# Patient Record
Sex: FEMALE | Race: WHITE | NOT HISPANIC OR LATINO | Employment: UNEMPLOYED | URBAN - NONMETROPOLITAN AREA
[De-identification: names, ages, dates, MRNs, and addresses within clinical notes are randomized per-mention and may not be internally consistent; named-entity substitution may affect disease eponyms.]

---

## 2023-11-08 ENCOUNTER — OFFICE VISIT (OUTPATIENT)
Dept: CARDIOLOGY | Facility: CLINIC | Age: 28
End: 2023-11-08
Payer: COMMERCIAL

## 2023-11-08 VITALS
WEIGHT: 186.6 LBS | BODY MASS INDEX: 31.09 KG/M2 | HEART RATE: 104 BPM | SYSTOLIC BLOOD PRESSURE: 124 MMHG | HEIGHT: 65 IN | DIASTOLIC BLOOD PRESSURE: 72 MMHG

## 2023-11-08 DIAGNOSIS — R07.89 CHEST PAIN, ATYPICAL: ICD-10-CM

## 2023-11-08 DIAGNOSIS — R00.2 PALPITATIONS: Primary | ICD-10-CM

## 2023-11-08 DIAGNOSIS — R06.02 SHORTNESS OF BREATH: ICD-10-CM

## 2023-11-08 DIAGNOSIS — R79.89 ELEVATED BRAIN NATRIURETIC PEPTIDE (BNP) LEVEL: ICD-10-CM

## 2023-11-08 DIAGNOSIS — Z72.0 TOBACCO ABUSE: ICD-10-CM

## 2023-11-08 DIAGNOSIS — R60.0 LOCALIZED EDEMA: ICD-10-CM

## 2023-11-08 DIAGNOSIS — R00.0 SINUS TACHYCARDIA: ICD-10-CM

## 2023-11-08 DIAGNOSIS — E66.9 OBESITY (BMI 30.0-34.9): ICD-10-CM

## 2023-11-08 PROCEDURE — 99204 OFFICE O/P NEW MOD 45 MIN: CPT | Performed by: NURSE PRACTITIONER

## 2023-11-08 PROCEDURE — 93000 ELECTROCARDIOGRAM COMPLETE: CPT | Performed by: NURSE PRACTITIONER

## 2023-11-08 RX ORDER — PREDNISONE 5 MG/1
TABLET ORAL
COMMUNITY

## 2023-11-08 RX ORDER — IBUPROFEN 600 MG/1
600 TABLET ORAL EVERY 6 HOURS PRN
COMMUNITY

## 2023-11-08 RX ORDER — BENZONATATE 100 MG/1
CAPSULE ORAL
COMMUNITY
Start: 2023-09-08

## 2023-11-08 RX ORDER — GUAIFENESIN 600 MG/1
TABLET, EXTENDED RELEASE ORAL EVERY 12 HOURS SCHEDULED
COMMUNITY
Start: 2023-11-08

## 2023-11-08 RX ORDER — BROMPHENIRAMINE MALEATE, PSEUDOEPHEDRINE HYDROCHLORIDE, AND DEXTROMETHORPHAN HYDROBROMIDE 2; 30; 10 MG/5ML; MG/5ML; MG/5ML
SYRUP ORAL
COMMUNITY
Start: 2023-11-08

## 2023-11-08 RX ORDER — AMOXICILLIN AND CLAVULANATE POTASSIUM 875; 125 MG/1; MG/1
TABLET, FILM COATED ORAL EVERY 12 HOURS SCHEDULED
COMMUNITY
Start: 2023-11-08

## 2023-11-08 RX ORDER — HYDROCHLOROTHIAZIDE 12.5 MG/1
12.5 TABLET ORAL DAILY
COMMUNITY

## 2023-11-08 NOTE — PROGRESS NOTES
Chief Complaint   Patient presents with    Establish Care     Pt is here to establish care.  She states she has been having palpitations, SOB, swelling in her hands and CP for about 7 months.  She states the swelling is brought on my normal daily activities (mopping, laundry, dishes).  She states she is only SOB when she is active.  She states CP is mainly when she takes a deep breath.  She had a steroid and abx shot this morning for congestion/ear infection.    Cardiac History     Pt denies ever being evaluated by cardiology.  She did have an echo completed in September at [facility], however the report is not available.    Lab Work     Pt had labs completed this morning with her PCP.       Cardiac Complaints  chest pressure/discomfort, dyspnea, and palpitations      Subjective   Lillian Sheikh is a 28 y.o. female with no significant medical history. She is referred today from PCP for elevated BNP, which is not available for review. She admits that echo was ordered to assess, but the report was lost.  She states HCTZ started at low dose and this has been beneficial. She still has some mild edema in her hands, but for the most part, this has improved. She does report swelling after activities as well as chest pain that is a tightness or sharp pain in her chest with activity. She is SOA both at rest and with exertion. She reports WBC has been elevated for sometime, they have done multiple labs and testing. Most recent workup showed possible MONO. She reports recently being sick, got both a steroid and rocephin shot today. Was negative for COVID, FLU, RSV.  Chest xray was negative as well. Labs today to reassess WBC. She denies prior workup, except for recent echo which is not available. She also reports recent holter monitor, which is also not available. Family history of IHD noted. Positive for tobacco abuse.        Cardiac History  Past Surgical History:   Procedure Laterality Date     SECTION  2014     CONVERTED (HISTORICAL) HOLTER  09/2023    ORIF ULNA/RADIUS FRACTURES Left 2002    TONSILLECTOMY  2020    TUBAL ABDOMINAL LIGATION  08/17/2014       Current Outpatient Medications   Medication Sig Dispense Refill    amoxicillin-clavulanate (AUGMENTIN) 875-125 MG per tablet Every 12 (Twelve) Hours.      benzonatate (TESSALON) 100 MG capsule TAKE ONE CAPSULE BY MOUTH EVERY 4 HOURS AS NEEDED FOR 5 DAYS      brompheniramine-pseudoephedrine-DM 30-2-10 MG/5ML syrup Take 10 mL Orally every 6 hours as needed for cough/congestion for 5 days      cholecalciferol (VITAMIN D3) 1.25 MG (67335 UT) capsule Take 1 capsule by mouth Every 7 (Seven) Days.      cyanocobalamin ([employer] Vitamin B-12) 1000 MCG tablet TAKE ONE TABLET BY MOUTH DAILY for 30      guaiFENesin (MUCINEX) 600 MG 12 hr tablet Every 12 (Twelve) Hours.      hydroCHLOROthiazide (HYDRODIURIL) 12.5 MG tablet Take 1 tablet by mouth Daily.      ibuprofen (ADVIL,MOTRIN) 600 MG tablet Take 1 tablet by mouth Every 6 (Six) Hours As Needed for Mild Pain.      predniSONE 5 MG (21) tablet therapy pack dose pack Complete 1 therapy pack as directed Orally as directed for 6 days       No current facility-administered medications for this visit.       Patient has no known allergies.    Past Medical History:   Diagnosis Date    Anemia     Depression     Hypertension     Migraine     Palpitations        Social History     Socioeconomic History    Marital status:    Tobacco Use    Smoking status: Every Day     Packs/day: 0.50     Years: 12.00     Additional pack years: 0.00     Total pack years: 6.00     Types: Cigarettes    Smokeless tobacco: Never   Vaping Use    Vaping Use: Never used   Substance and Sexual Activity    Alcohol use: Yes     Comment: about 6 per month    Drug use: Yes     Types: Marijuana    Sexual activity: Yes     Partners: Male     Birth control/protection: Tubal ligation       Family History   Problem Relation Age of Onset    Arthritis Mother     Asthma Mother   "   Kidney disease Mother     Stroke Mother     Diabetes Father     Hypertension Father     Thyroid disease Brother     Stroke Maternal Grandmother     No Known Problems Paternal Grandmother     Sick sinus syndrome Paternal Grandfather     No Known Problems Son     No Known Problems Son     Asthma Daughter        Review of Systems   Constitutional: Positive for malaise/fatigue. Negative for night sweats.   Cardiovascular:  Positive for chest pain, dyspnea on exertion, leg swelling and palpitations. Negative for claudication, irregular heartbeat, near-syncope and syncope.   Respiratory:  Positive for shortness of breath. Negative for cough and wheezing.    Musculoskeletal:  Negative for back pain, joint pain and stiffness.   Gastrointestinal:  Negative for anorexia, heartburn, nausea and vomiting.   Genitourinary:  Negative for dysuria, hematuria, hesitancy and nocturia.   Neurological:  Negative for dizziness, headaches and light-headedness.   Psychiatric/Behavioral:  Negative for depression and memory loss. The patient is not nervous/anxious.            Objective     /72 (BP Location: Right arm, Patient Position: Sitting)   Pulse 104   Ht 165.1 cm (65\")   Wt 84.6 kg (186 lb 9.6 oz)   BMI 31.05 kg/m²     Constitutional:       Appearance: Not in distress.   Eyes:      Pupils: Pupils are equal, round, and reactive to light.   HENT:      Nose: Nose normal.   Pulmonary:      Effort: Pulmonary effort is normal.      Breath sounds: Normal breath sounds.   Cardiovascular:      PMI at left midclavicular line. Tachycardia present. Regular rhythm.      Murmurs: There is a systolic murmur.   Abdominal:      Palpations: Abdomen is soft.   Musculoskeletal: Normal range of motion.      Cervical back: Normal range of motion and neck supple. Skin:     General: Skin is warm and dry.   Neurological:      Mental Status: Alert.           ECG 12 Lead    Date/Time: 11/8/2023 2:38 PM  Performed by: Venita Lai, " JENNI    Authorized by: Venita Lai APRN  Comparison: compared with previous ECG from 9/6/2023  Similar to previous ECG  Comparison to previous ECG: Rate higher today  Rhythm: sinus tachycardia  BPM: 104    Clinical impression: normal ECG  Comments: Normal QT               Diagnoses and all orders for this visit:    1. Palpitations (Primary)  -     Adult Transthoracic Echo Complete W/ Cont if Necessary Per Protocol; Future  -     Treadmill Stress Test; Future  -     ECG 12 Lead  -     TSH; Future    2. Shortness of breath  -     Adult Transthoracic Echo Complete W/ Cont if Necessary Per Protocol; Future  -     Treadmill Stress Test; Future  -     BNP; Future  -     Comprehensive Metabolic Panel; Future    3. Localized edema  -     Adult Transthoracic Echo Complete W/ Cont if Necessary Per Protocol; Future  -     Treadmill Stress Test; Future  -     BNP; Future  -     Comprehensive Metabolic Panel; Future    4. Sinus tachycardia  -     Adult Transthoracic Echo Complete W/ Cont if Necessary Per Protocol; Future  -     Treadmill Stress Test; Future  -     ECG 12 Lead  -     TSH; Future    5. Chest pain, atypical    6. Elevated brain natriuretic peptide (BNP) level    7. Tobacco abuse    8. Obesity (BMI 30.0-34.9)             Palpitations/SOA: Workup with stress and echo encouraged to assess for functional concerns and/or structural concerns. Most recent echo report has been 'lost'. Stress to be done as treadmill stress. EKG done today in regards showed sinus tach, but she had just had a steroid injection, quite possibly side effect. Special attention to be paid to her HR during stress.     Elevated BNP: On HCTZ therapy, tolerates well. Edema has improved. Will repeat BNP to assess for efficacy of therapy.      Elevated WBC: Followed by your office, further workup today.      Tobacco abuse, smoking despite concerns, cutting back, not ready to quit at present.    BMI noted at 31.05, good cardiac diet encouraged.      6 month follow up urged, sooner if needed.        Problems Addressed this Visit    None  Visit Diagnoses       Palpitations    -  Primary    Relevant Orders    Adult Transthoracic Echo Complete W/ Cont if Necessary Per Protocol    Treadmill Stress Test    ECG 12 Lead    TSH    Shortness of breath        Relevant Orders    Adult Transthoracic Echo Complete W/ Cont if Necessary Per Protocol    Treadmill Stress Test    BNP    Comprehensive Metabolic Panel    Localized edema        Relevant Orders    Adult Transthoracic Echo Complete W/ Cont if Necessary Per Protocol    Treadmill Stress Test    BNP    Comprehensive Metabolic Panel    Sinus tachycardia        Relevant Orders    Adult Transthoracic Echo Complete W/ Cont if Necessary Per Protocol    Treadmill Stress Test    ECG 12 Lead    TSH    Chest pain, atypical        Elevated brain natriuretic peptide (BNP) level        Tobacco abuse        Obesity (BMI 30.0-34.9)              Diagnoses         Codes Comments    Palpitations    -  Primary ICD-10-CM: R00.2  ICD-9-CM: 785.1     Shortness of breath     ICD-10-CM: R06.02  ICD-9-CM: 786.05     Localized edema     ICD-10-CM: R60.0  ICD-9-CM: 782.3     Sinus tachycardia     ICD-10-CM: R00.0  ICD-9-CM: 427.89     Chest pain, atypical     ICD-10-CM: R07.89  ICD-9-CM: 786.59     Elevated brain natriuretic peptide (BNP) level     ICD-10-CM: R79.89  ICD-9-CM: 790.99     Tobacco abuse     ICD-10-CM: Z72.0  ICD-9-CM: 305.1     Obesity (BMI 30.0-34.9)     ICD-10-CM: E66.9  ICD-9-CM: 278.00             Lillian Sheikh  reports that she has been smoking cigarettes. She has a 6.00 pack-year smoking history. She has never used smokeless tobacco.. I have educated her on the risk of diseases from using tobacco products.     I advised her to quit and she is not willing to quit.    I spent 3  minutes counseling the patient.                 Electronically signed by Venita Lai, JENNI November 8, 2023 17:20 EST

## 2023-11-21 ENCOUNTER — LAB (OUTPATIENT)
Dept: LAB | Facility: HOSPITAL | Age: 28
End: 2023-11-21
Payer: COMMERCIAL

## 2023-11-21 ENCOUNTER — HOSPITAL ENCOUNTER (OUTPATIENT)
Dept: CARDIOLOGY | Facility: HOSPITAL | Age: 28
Discharge: HOME | End: 2023-11-21
Payer: COMMERCIAL

## 2023-11-21 VITALS — WEIGHT: 186.51 LBS | HEIGHT: 65 IN | BODY MASS INDEX: 31.07 KG/M2

## 2023-11-21 DIAGNOSIS — R60.0 LOCALIZED EDEMA: ICD-10-CM

## 2023-11-21 DIAGNOSIS — R06.02 SHORTNESS OF BREATH: ICD-10-CM

## 2023-11-21 DIAGNOSIS — R00.0 SINUS TACHYCARDIA: ICD-10-CM

## 2023-11-21 DIAGNOSIS — R00.2 PALPITATIONS: ICD-10-CM

## 2023-11-21 LAB
ALBUMIN SERPL-MCNC: 4.1 G/DL (ref 3.5–5.2)
ALBUMIN/GLOB SERPL: 1.8 G/DL
ALP SERPL-CCNC: 59 U/L (ref 39–117)
ALT SERPL W P-5'-P-CCNC: 19 U/L (ref 1–33)
ANION GAP SERPL CALCULATED.3IONS-SCNC: 10.7 MMOL/L (ref 5–15)
AST SERPL-CCNC: 14 U/L (ref 1–32)
BH CV ECHO MEAS - AO ROOT DIAM: 2.7 CM
BH CV ECHO MEAS - EDV(CUBED): 97.4 ML
BH CV ECHO MEAS - EF(MOD-BP): 56 %
BH CV ECHO MEAS - EF_3D-VOL: 53 %
BH CV ECHO MEAS - ESV(CUBED): 34.4 ML
BH CV ECHO MEAS - FS: 29.4 %
BH CV ECHO MEAS - IVS/LVPW: 1.09 CM
BH CV ECHO MEAS - IVSD: 0.94 CM
BH CV ECHO MEAS - LA DIMENSION: 3.8 CM
BH CV ECHO MEAS - LAT PEAK E' VEL: 18.6 CM/SEC
BH CV ECHO MEAS - LV MASS(C)D: 137.7 GRAMS
BH CV ECHO MEAS - LV MAX PG: 4.2 MMHG
BH CV ECHO MEAS - LV MEAN PG: 2.21 MMHG
BH CV ECHO MEAS - LV V1 MAX: 102.8 CM/SEC
BH CV ECHO MEAS - LV V1 VTI: 20.7 CM
BH CV ECHO MEAS - LVIDD: 4.6 CM
BH CV ECHO MEAS - LVIDS: 3.3 CM
BH CV ECHO MEAS - LVPWD: 0.86 CM
BH CV ECHO MEAS - MED PEAK E' VEL: 13.4 CM/SEC
BH CV ECHO MEAS - MV A MAX VEL: 36 CM/SEC
BH CV ECHO MEAS - MV DEC SLOPE: 378.9 CM/SEC2
BH CV ECHO MEAS - MV DEC TIME: 0.3 SEC
BH CV ECHO MEAS - MV E MAX VEL: 78.1 CM/SEC
BH CV ECHO MEAS - MV E/A: 2.17
BH CV ECHO MEAS - MV MAX PG: 4.5 MMHG
BH CV ECHO MEAS - MV MEAN PG: 1.46 MMHG
BH CV ECHO MEAS - MV P1/2T: 86.6 MSEC
BH CV ECHO MEAS - MV V2 VTI: 31.1 CM
BH CV ECHO MEAS - MVA(P1/2T): 2.5 CM2
BH CV ECHO MEAS - PA V2 MAX: 102.4 CM/SEC
BH CV ECHO MEAS - RAP SYSTOLE: 8 MMHG
BH CV ECHO MEAS - RV MAX PG: 2.02 MMHG
BH CV ECHO MEAS - RV V1 MAX: 71 CM/SEC
BH CV ECHO MEAS - RV V1 VTI: 14.1 CM
BH CV ECHO MEAS - RVDD: 2.9 CM
BH CV ECHO MEAS - RVSP: 17.6 MMHG
BH CV ECHO MEAS - TAPSE (>1.6): 2.01 CM
BH CV ECHO MEAS - TR MAX PG: 9.6 MMHG
BH CV ECHO MEAS - TR MAX VEL: 154.6 CM/SEC
BH CV ECHO MEASUREMENTS AVERAGE E/E' RATIO: 4.88
BH CV STRESS RECOVERY BP: NORMAL MMHG
BH CV STRESS RECOVERY HR: 91 BPM
BH CV XLRA - TDI S': 10.7 CM/SEC
BILIRUB SERPL-MCNC: 0.2 MG/DL (ref 0–1.2)
BUN SERPL-MCNC: 13 MG/DL (ref 6–20)
BUN/CREAT SERPL: 14.3 (ref 7–25)
CALCIUM SPEC-SCNC: 9 MG/DL (ref 8.6–10.5)
CHLORIDE SERPL-SCNC: 101 MMOL/L (ref 98–107)
CO2 SERPL-SCNC: 24.3 MMOL/L (ref 22–29)
CREAT SERPL-MCNC: 0.91 MG/DL (ref 0.57–1)
EGFRCR SERPLBLD CKD-EPI 2021: 87.2 ML/MIN/1.73
GLOBULIN UR ELPH-MCNC: 2.3 GM/DL
GLUCOSE SERPL-MCNC: 103 MG/DL (ref 65–99)
MAXIMAL PREDICTED HEART RATE: 190 BPM
NT-PROBNP SERPL-MCNC: 269.9 PG/ML (ref 0–450)
PERCENT MAX PREDICTED HR: 90 %
POTASSIUM SERPL-SCNC: 3.8 MMOL/L (ref 3.5–5.2)
PROT SERPL-MCNC: 6.4 G/DL (ref 6–8.5)
SINUS: 2.5 CM
SODIUM SERPL-SCNC: 136 MMOL/L (ref 136–145)
STRESS BASELINE BP: NORMAL MMHG
STRESS BASELINE HR: 73 BPM
STRESS PERCENT HR: 106 %
STRESS POST ESTIMATED WORKLOAD: 8.6 METS
STRESS POST EXERCISE DUR MIN: 6 MIN
STRESS POST EXERCISE DUR SEC: 32 SEC
STRESS POST PEAK BP: NORMAL MMHG
STRESS POST PEAK HR: 171 BPM
STRESS TARGET HR: 162 BPM
TSH SERPL DL<=0.05 MIU/L-ACNC: 2.48 UIU/ML (ref 0.27–4.2)

## 2023-11-21 PROCEDURE — 93017 CV STRESS TEST TRACING ONLY: CPT

## 2023-11-21 PROCEDURE — 93306 TTE W/DOPPLER COMPLETE: CPT

## 2023-11-21 PROCEDURE — 84443 ASSAY THYROID STIM HORMONE: CPT | Performed by: NURSE PRACTITIONER

## 2023-11-21 PROCEDURE — 83880 ASSAY OF NATRIURETIC PEPTIDE: CPT | Performed by: NURSE PRACTITIONER

## 2023-11-21 PROCEDURE — 80053 COMPREHEN METABOLIC PANEL: CPT | Performed by: NURSE PRACTITIONER

## 2023-11-22 RX ORDER — METOPROLOL SUCCINATE 25 MG/1
25 TABLET, EXTENDED RELEASE ORAL DAILY
Qty: 90 TABLET | Refills: 3 | Status: SHIPPED | OUTPATIENT
Start: 2023-11-22

## 2023-11-22 NOTE — TELEPHONE ENCOUNTER
----- Message from JENNI Mooney sent at 11/21/2023  4:46 PM EST -----  Neg stress, increased HR, add toprol at 25mg Xl

## 2024-05-16 ENCOUNTER — OFFICE VISIT (OUTPATIENT)
Dept: CARDIOLOGY | Facility: CLINIC | Age: 29
End: 2024-05-16
Payer: COMMERCIAL

## 2024-05-16 VITALS
DIASTOLIC BLOOD PRESSURE: 78 MMHG | HEART RATE: 90 BPM | WEIGHT: 206.6 LBS | BODY MASS INDEX: 35.27 KG/M2 | HEIGHT: 64 IN | SYSTOLIC BLOOD PRESSURE: 122 MMHG

## 2024-05-16 DIAGNOSIS — F17.200 SMOKING: ICD-10-CM

## 2024-05-16 DIAGNOSIS — R60.0 EDEMA LEG: ICD-10-CM

## 2024-05-16 DIAGNOSIS — E66.01 SEVERE OBESITY (BMI 35.0-39.9) WITH COMORBIDITY: ICD-10-CM

## 2024-05-16 DIAGNOSIS — R00.2 PALPITATIONS: ICD-10-CM

## 2024-05-16 DIAGNOSIS — R00.0 TACHYCARDIA: Primary | ICD-10-CM

## 2024-05-16 RX ORDER — VARENICLINE TARTRATE 1 MG/1
1 TABLET, FILM COATED ORAL 2 TIMES DAILY
Qty: 56 TABLET | Refills: 1 | Status: SHIPPED | OUTPATIENT
Start: 2024-06-13 | End: 2024-08-08

## 2024-05-16 RX ORDER — FLUTICASONE PROPIONATE AND SALMETEROL 50; 250 UG/1; UG/1
POWDER RESPIRATORY (INHALATION)
COMMUNITY
Start: 2024-02-13

## 2024-05-16 RX ORDER — VARENICLINE TARTRATE 0.5 (11)-1
KIT ORAL
Qty: 1 EACH | Refills: 0 | Status: SHIPPED | OUTPATIENT
Start: 2024-05-16 | End: 2024-06-13

## 2024-05-16 RX ORDER — METOPROLOL SUCCINATE 50 MG/1
50 TABLET, EXTENDED RELEASE ORAL DAILY
Qty: 30 TABLET | Refills: 11 | Status: SHIPPED | OUTPATIENT
Start: 2024-05-16

## 2024-05-16 RX ORDER — HYDROCHLOROTHIAZIDE 12.5 MG/1
12.5 TABLET ORAL DAILY
Qty: 90 TABLET | Refills: 2 | Status: SHIPPED | OUTPATIENT
Start: 2024-05-16

## 2024-05-16 NOTE — PROGRESS NOTES
Chief Complaint   Patient presents with    Follow-up     Pt is here for cardiac follow up.  Pt reports SOB, she is now seeing Dr Teague who has her on two inhalers.  She is also wanting to quit smoking.  She tried patches and gum and was not successful.  She states her palpitations are getting better with meds.  She denies dizziness or CP.  She does not take a daily ASA.      Med Refill     Pt request 30 day refills to be sent to [facility].  Medications were verified by  the pt.    Lab Work     Pt states their last labs were in November with [facility].         Cardiac Complaints  dyspnea      Subjective   Lillian Sheikh is a 29 y.o. female referred in  for edema and elevated BNP. She underwent workup after consult in 2023 which was negative for ischemia, good LV function, and only trace MR noted. HR elevated on stress, toprol added.    She comes today for follow up and admits to SOA, now followed by pulmonary, diagnosed with asthma, using inhalers which have been beneficial. She does admit BB have helped her palpitations. She does admit she is currently trying to quit smoking, tried gum unsuccessful, wanting other options. Labs November showed: TSH 2.480, .9, BUN 13, Creatinine 0.91, Na 136, K 3.8, GFR 87.2. Refills requested.        Cardiac History  Past Surgical History:   Procedure Laterality Date    CARDIOVASCULAR STRESS TEST  2023    R.Stress- 6.32 Min. 8.6 METS. 90% THR. 165/69. Negative     SECTION  2014    CONVERTED (HISTORICAL) HOLTER  09/15/2023    @ [facility]. 4 Days. Avg 89. . Rare PAC & PVC. Rare AV Block    ECHO - CONVERTED  2023    EF 55%. LA- 3.8. Trace-Mild MR. RVSP- 18 mmHg    ORIF ULNA/RADIUS FRACTURES Left     TONSILLECTOMY  2020    TUBAL ABDOMINAL LIGATION  2014       Current Outpatient Medications   Medication Sig Dispense Refill    Advair Diskus 250-50 MCG/ACT DISKUS       cholecalciferol (VITAMIN D3) 1.25 MG (21104 UT) capsule  Take 1 capsule by mouth Every 7 (Seven) Days.      cyanocobalamin ([employer] Vitamin B-12) 1000 MCG tablet TAKE ONE TABLET BY MOUTH DAILY for 30      hydroCHLOROthiazide 12.5 MG tablet Take 1 tablet by mouth Daily. 90 tablet 2    ibuprofen (ADVIL,MOTRIN) 600 MG tablet Take 1 tablet by mouth Every 6 (Six) Hours As Needed for Mild Pain.      Tiotropium Bromide Monohydrate (SPIRIVA HANDIHALER IN) Inhale.      metoprolol succinate XL (TOPROL-XL) 50 MG 24 hr tablet Take 1 tablet by mouth Daily. 30 tablet 11    [START ON 6/13/2024] varenicline (Chantix Continuing Month Pak) 1 MG tablet Take 1 tablet by mouth 2 (Two) Times a Day for 56 days. 56 tablet 1    Varenicline Tartrate, Starter, 0.5 MG X 11 & 1 MG X 42 tablet therapy pack Take 0.5 mg by mouth Daily for 3 days, THEN 0.5 mg 2 (Two) Times a Day for 4 days, THEN 1 mg 2 (Two) Times a Day for 21 days. Take 0.5 mg po daily x 3 days, then 0.5 mg po bid x 4 days, then 1 mg po bid 1 each 0     No current facility-administered medications for this visit.       Patient has no known allergies.    Past Medical History:   Diagnosis Date    Anemia     Depression     Hypertension     Migraine     Palpitations        Social History     Socioeconomic History    Marital status:    Tobacco Use    Smoking status: Every Day     Current packs/day: 0.50     Average packs/day: 0.5 packs/day for 15.4 years (7.7 ttl pk-yrs)     Types: Cigarettes     Start date: 2009    Smokeless tobacco: Never   Vaping Use    Vaping status: Never Used   Substance and Sexual Activity    Alcohol use: Yes     Comment: social    Drug use: Yes     Frequency: 2.0 times per week     Types: Marijuana    Sexual activity: Yes     Partners: Male     Birth control/protection: Tubal ligation       Family History   Problem Relation Age of Onset    Arthritis Mother     Asthma Mother     Kidney disease Mother     Stroke Mother     Diabetes Father     Hypertension Father     Thyroid disease Brother     Stroke Maternal  "Grandmother     No Known Problems Paternal Grandmother     Sick sinus syndrome Paternal Grandfather     No Known Problems Son     No Known Problems Son     Asthma Daughter        Review of Systems   Constitutional: Negative for malaise/fatigue and night sweats.   Cardiovascular:  Negative for chest pain, claudication, dyspnea on exertion, irregular heartbeat, leg swelling, near-syncope, orthopnea, palpitations and syncope.   Respiratory:  Positive for shortness of breath. Negative for cough and wheezing.    Musculoskeletal:  Negative for back pain, joint pain and stiffness.   Gastrointestinal:  Negative for anorexia, dysphagia, nausea and vomiting.   Genitourinary:  Negative for dysuria, hematuria, hesitancy and nocturia.   Neurological:  Negative for dizziness, light-headedness and loss of balance.   Psychiatric/Behavioral:  Negative for depression and memory loss. The patient is not nervous/anxious.            Objective     /78 (BP Location: Left arm, Patient Position: Sitting)   Pulse 90   Ht 162.6 cm (64\")   Wt 93.7 kg (206 lb 9.6 oz)   BMI 35.46 kg/m²     Constitutional:       Appearance: Not in distress.   Eyes:      Pupils: Pupils are equal, round, and reactive to light.   HENT:      Nose: Nose normal.   Pulmonary:      Effort: Pulmonary effort is normal.      Breath sounds: Normal breath sounds.   Cardiovascular:      PMI at left midclavicular line. Normal rate. Regular rhythm.      Murmurs: There is a systolic murmur.   Abdominal:      Palpations: Abdomen is soft.   Musculoskeletal: Normal range of motion.      Cervical back: Normal range of motion and neck supple. Skin:     General: Skin is warm and dry.   Neurological:      Mental Status: Alert.         Procedures         Diagnoses and all orders for this visit:    1. Tachycardia (Primary)    2. Edema leg    3. Palpitations    4. Smoking    5. Severe obesity (BMI 35.0-39.9) with comorbidity    Other orders  -     metoprolol succinate XL " (TOPROL-XL) 50 MG 24 hr tablet; Take 1 tablet by mouth Daily.  Dispense: 30 tablet; Refill: 11  -     hydroCHLOROthiazide 12.5 MG tablet; Take 1 tablet by mouth Daily.  Dispense: 90 tablet; Refill: 2  -     Varenicline Tartrate, Starter, 0.5 MG X 11 & 1 MG X 42 tablet therapy pack; Take 0.5 mg by mouth Daily for 3 days, THEN 0.5 mg 2 (Two) Times a Day for 4 days, THEN 1 mg 2 (Two) Times a Day for 21 days. Take 0.5 mg po daily x 3 days, then 0.5 mg po bid x 4 days, then 1 mg po bid  Dispense: 1 each; Refill: 0  -     varenicline (Chantix Continuing Month Pak) 1 MG tablet; Take 1 tablet by mouth 2 (Two) Times a Day for 56 days.  Dispense: 56 tablet; Refill: 1               Palpitations/tachycardia: Improved. HR lower than prior. Will continue with BB therapy, will increase to 50mg XL nightly.  Limited caffeine and adequate fluid intake advised.    HTN: BP is stable. If should be low with increase of BB, will urge to do the HCTZ every other day as BNP normal, edema well controlled.     Edema: Improved with HCTZ.    SOA/? Asthma: Followed by pulmonary, now on inhalers, finds beneficial.     Cardiac status: Stable. Cardiac workup neg for ischemia, good LV function, no sig valve concerns, no heart failure noted.    Tobacco abuse: Still smoking, but ready to quit. I did send Chantix for her to try to aid in cessation.     Refills per request    BMI noted at 35.46, good cardiac diet with limited carbs, calories, and activity as tolerated advised    6 month follow up urged, sooner if needed.             Problems Addressed this Visit    None  Visit Diagnoses       Tachycardia    -  Primary    Edema leg        Palpitations        Smoking        Severe obesity (BMI 35.0-39.9) with comorbidity              Diagnoses         Codes Comments    Tachycardia    -  Primary ICD-10-CM: R00.0  ICD-9-CM: 785.0     Edema leg     ICD-10-CM: R60.0  ICD-9-CM: 782.3     Palpitations     ICD-10-CM: R00.2  ICD-9-CM: 785.1     Smoking      ICD-10-CM: F17.200  ICD-9-CM: 305.1     Severe obesity (BMI 35.0-39.9) with comorbidity     ICD-10-CM: E66.01  ICD-9-CM: 278.01                   Lillian Sheikh  reports that she has been smoking cigarettes. She started smoking about 15 years ago. She has a 7.7 pack-year smoking history. She has never used smokeless tobacco. I have educated her on the risk of diseases from using tobacco products.     I advised her to quit and she is willing to quit. We have discussed the following method/s for tobacco cessation:  Prescription Medicaiton.    I spent 3  minutes counseling the patient.                 Electronically signed by JENNI Iverson May 16, 2024 10:27 EDT